# Patient Record
Sex: FEMALE | Race: WHITE | NOT HISPANIC OR LATINO | URBAN - METROPOLITAN AREA
[De-identification: names, ages, dates, MRNs, and addresses within clinical notes are randomized per-mention and may not be internally consistent; named-entity substitution may affect disease eponyms.]

---

## 2019-01-29 ENCOUNTER — EMERGENCY (EMERGENCY)
Facility: HOSPITAL | Age: 49
LOS: 0 days | Discharge: HOME | End: 2019-01-30
Attending: EMERGENCY MEDICINE | Admitting: EMERGENCY MEDICINE

## 2019-01-29 VITALS
OXYGEN SATURATION: 97 % | RESPIRATION RATE: 18 BRPM | DIASTOLIC BLOOD PRESSURE: 77 MMHG | SYSTOLIC BLOOD PRESSURE: 125 MMHG | HEART RATE: 75 BPM | TEMPERATURE: 98 F

## 2019-01-29 DIAGNOSIS — E78.5 HYPERLIPIDEMIA, UNSPECIFIED: ICD-10-CM

## 2019-01-29 DIAGNOSIS — E78.00 PURE HYPERCHOLESTEROLEMIA, UNSPECIFIED: ICD-10-CM

## 2019-01-29 DIAGNOSIS — R06.02 SHORTNESS OF BREATH: ICD-10-CM

## 2019-01-29 DIAGNOSIS — R05 COUGH: ICD-10-CM

## 2019-01-29 NOTE — ED ADULT NURSE NOTE - CHPI ED NUR SYMPTOMS NEG
no diaphoresis/no headache/no chest pain/no shortness of breath/no edema/no chills/no body aches/no hemoptysis/no fever/no wheezing

## 2019-01-29 NOTE — ED ADULT NURSE NOTE - NSIMPLEMENTINTERV_GEN_ALL_ED
Implemented All Universal Safety Interventions:  [place] to call system. Call bell, personal items and telephone within reach. Instruct patient to call for assistance. Room bathroom lighting operational. Non-slip footwear when patient is off stretcher. Physically safe environment: no spills, clutter or unnecessary equipment. Stretcher in lowest position, wheels locked, appropriate side rails in place.

## 2019-01-29 NOTE — ED ADULT NURSE NOTE - OBJECTIVE STATEMENT
patient complaints of cough. Patient  wants patient to have xray because she has a PMH of pneumonia and he thinks she may have pneumonia.  Patient  reports patient is SOB.  Patient EUth=967% on room air and no SOB noted.

## 2019-01-30 NOTE — ED PROVIDER NOTE - ATTENDING CONTRIBUTION TO CARE
hchol  speak Frisian  cough x 2 weeks.  fever, t max 101 at the beginning of that .  Also c/o some intermittent cp and difficulty breathing.  Persistent cough. Has taken 4d of a 7d course of levaquin. Came here for CXR only. Pt's  is translating.  Language: French.  Deferred the offer of a phone .  47 y/o F here with her .  PMH hchol.  Pt with a cough x 2 weeks.  Initially with fever, t max 101 at the beginning of that 2 week period, but that has resolved.  Also c/o some intermittent cp and difficulty breathing when she has coughing attacks, but not at rest.  Pt here because of the persistent cough. Has taken 4d of a 7d course of levaquin given to her by her PCP, but her PCP refused to do a CXR, so they came here for CXR because the Pt is worried she has pna.  No cp or sob here.  + dry cough.  EXAM: well appearing. NAD. s1s2, reg. CTAB. abd soft, nd, nt.  No calf ttp or swelling.  P: Pt requesting only a CXR.

## 2019-01-30 NOTE — ED PROVIDER NOTE - OBJECTIVE STATEMENT
Azerbaijani speaking only female, requests ad hoc  .  47 y/o F with PMH HLD, recent cough, congestion, runny nose x 2 wks seen by PMD who rxed Levaquin x 7 days, on day 4 of levaquin presents with resolved congestion but persistent cough presents for CXR. +sick contacts. Denies CP, palpitations, SOB, back pain, abdominal pain, n/v/d, fevers, sweats, chills, HA, sore throat/difficulty swallowing, confusion, trauma, fall, recent travel, leg pain/swelling, urinary symptoms, rash, hormone use, hx of cancers, hemoptysis, recent immobilization/surgeries.

## 2019-01-30 NOTE — ED PROVIDER NOTE - MEDICAL DECISION MAKING DETAILS
no pan noted on CXR.  Pt already on abx and should continue the abx.  On levaquin 500mg for 7days.  Pt to f/u with PCP as needed.  well appearing at time of d/c.

## 2019-01-30 NOTE — ED PROVIDER NOTE - PHYSICAL EXAMINATION
PHYSICAL EXAM:    GENERAL: Alert, appears stated age, well appearing, non-toxic  SKIN: Warm, pink and dry. MMM.   EYE: Normal lids/conjunctiva  ENT: Normal hearing, patent oropharynx  NECK: +supple. No meningismus, or JVD  Pulm: Bilateral BS, normal resp effort, no wheezes, stridor, or retractions  CV: RRR, no M/R/G, 2+and = radial pulses  Abd: soft, non-tender, non-distended  Mskel: no erythema, cyanosis, edema. no calf tenderness  Neuro: AAOx3,  normal gait.

## 2019-01-30 NOTE — ED PROVIDER NOTE - NS ED ROS FT
Review of Systems    Constitutional: (-) fever  Cardiovascular: (-) chest pain, (-) syncope  Respiratory: (+) cough, (-) shortness of breath  Gastrointestinal: (-) vomiting, (-) diarrhea  Musculoskeletal: (-) neck pain, (-) back pain  Integumentary: (-) rash, (-) edema  Neurological: (-) headache    **All other ROS negative except as per above/HPI**

## 2021-02-10 NOTE — ED ADULT TRIAGE NOTE - SOURCE OF INFORMATION
Patient [de-identified] : Hearing Test performed to evaluate the extent of hearing loss and  to explain pt's symptoms\par today's hearing loss was personally reviewed and revealed Bilateral moderate to severe sensorineural hearing loss\par